# Patient Record
Sex: FEMALE | Race: WHITE
[De-identification: names, ages, dates, MRNs, and addresses within clinical notes are randomized per-mention and may not be internally consistent; named-entity substitution may affect disease eponyms.]

---

## 2019-08-27 ENCOUNTER — HOSPITAL ENCOUNTER (EMERGENCY)
Dept: HOSPITAL 91 - E/R | Age: 59
Discharge: HOME | End: 2019-08-27
Payer: COMMERCIAL

## 2019-08-27 ENCOUNTER — HOSPITAL ENCOUNTER (EMERGENCY)
Dept: HOSPITAL 10 - E/R | Age: 59
Discharge: HOME | End: 2019-08-27
Payer: COMMERCIAL

## 2019-08-27 VITALS — HEART RATE: 95 BPM | DIASTOLIC BLOOD PRESSURE: 80 MMHG | SYSTOLIC BLOOD PRESSURE: 105 MMHG | RESPIRATION RATE: 16 BRPM

## 2019-08-27 VITALS
WEIGHT: 160.34 LBS | HEIGHT: 67 IN | BODY MASS INDEX: 25.17 KG/M2 | HEIGHT: 67 IN | WEIGHT: 160.34 LBS | BODY MASS INDEX: 25.17 KG/M2

## 2019-08-27 DIAGNOSIS — E03.9: ICD-10-CM

## 2019-08-27 DIAGNOSIS — R40.2362: ICD-10-CM

## 2019-08-27 DIAGNOSIS — R42: Primary | ICD-10-CM

## 2019-08-27 DIAGNOSIS — R40.2142: ICD-10-CM

## 2019-08-27 DIAGNOSIS — R40.2252: ICD-10-CM

## 2019-08-27 LAB
ADD MAN DIFF?: NO
ALANINE AMINOTRANSFERASE: 26 IU/L (ref 13–69)
ALBUMIN/GLOBULIN RATIO: 1.41
ALBUMIN: 4.1 G/DL (ref 3.3–4.9)
ALKALINE PHOSPHATASE: 77 IU/L (ref 42–121)
ANION GAP: 11 (ref 5–13)
ASPARTATE AMINO TRANSFERASE: 28 IU/L (ref 15–46)
BASOPHIL #: 0 10^3/UL (ref 0–0.1)
BASOPHILS %: 0.4 % (ref 0–2)
BILIRUBIN,DIRECT: 0 MG/DL (ref 0–0.2)
BILIRUBIN,TOTAL: 0.7 MG/DL (ref 0.2–1.3)
BLOOD UREA NITROGEN: 13 MG/DL (ref 7–20)
CALCIUM: 10.3 MG/DL (ref 8.4–10.2)
CARBON DIOXIDE: 23 MMOL/L (ref 21–31)
CHLORIDE: 109 MMOL/L (ref 97–110)
CREATININE: 0.66 MG/DL (ref 0.44–1)
EOSINOPHILS #: 0 10^3/UL (ref 0–0.5)
EOSINOPHILS %: 0.9 % (ref 0–7)
GLOBULIN: 2.9 G/DL (ref 1.3–3.2)
GLUCOSE: 107 MG/DL (ref 70–220)
HEMATOCRIT: 41.9 % (ref 37–47)
HEMOGLOBIN: 13.8 G/DL (ref 12–16)
IMMATURE GRANS #M: 0.01 10^3/UL (ref 0–0.03)
IMMATURE GRANS % (M): 0.2 % (ref 0–0.43)
LYMPHOCYTES #: 1.5 10^3/UL (ref 0.8–2.9)
LYMPHOCYTES %: 32.5 % (ref 15–51)
MEAN CORPUSCULAR HEMOGLOBIN: 29.2 PG (ref 29–33)
MEAN CORPUSCULAR HGB CONC: 32.9 G/DL (ref 32–37)
MEAN CORPUSCULAR VOLUME: 88.6 FL (ref 82–101)
MEAN PLATELET VOLUME: 10.3 FL (ref 7.4–10.4)
MONOCYTE #: 0.4 10^3/UL (ref 0.3–0.9)
MONOCYTES %: 9.4 % (ref 0–11)
NEUTROPHIL #: 2.6 10^3/UL (ref 1.6–7.5)
NEUTROPHILS %: 56.6 % (ref 39–77)
NUCLEATED RED BLOOD CELLS #: 0 10^3/UL (ref 0–0)
NUCLEATED RED BLOOD CELLS%: 0 /100WBC (ref 0–0)
PLATELET COUNT: 314 10^3/UL (ref 140–415)
POTASSIUM: 4.2 MMOL/L (ref 3.5–5.1)
RED BLOOD COUNT: 4.73 10^6/UL (ref 4.2–5.4)
RED CELL DISTRIBUTION WIDTH: 12.9 % (ref 11.5–14.5)
SODIUM: 143 MMOL/L (ref 135–144)
TOTAL PROTEIN: 7 G/DL (ref 6.1–8.1)
TROPONIN-I: < 0.012 NG/ML (ref 0–0.12)
WHITE BLOOD COUNT: 4.6 10^3/UL (ref 4.8–10.8)

## 2019-08-27 PROCEDURE — 71275 CT ANGIOGRAPHY CHEST: CPT

## 2019-08-27 PROCEDURE — 84484 ASSAY OF TROPONIN QUANT: CPT

## 2019-08-27 PROCEDURE — 85025 COMPLETE CBC W/AUTO DIFF WBC: CPT

## 2019-08-27 PROCEDURE — 36415 COLL VENOUS BLD VENIPUNCTURE: CPT

## 2019-08-27 PROCEDURE — 93005 ELECTROCARDIOGRAM TRACING: CPT

## 2019-08-27 PROCEDURE — 96374 THER/PROPH/DIAG INJ IV PUSH: CPT

## 2019-08-27 PROCEDURE — 99285 EMERGENCY DEPT VISIT HI MDM: CPT

## 2019-08-27 PROCEDURE — 96361 HYDRATE IV INFUSION ADD-ON: CPT

## 2019-08-27 PROCEDURE — 80053 COMPREHEN METABOLIC PANEL: CPT

## 2019-08-27 RX ADMIN — LIDOCAINE HYDROCHLORIDE 1 MLS/HR: 10 INJECTION, SOLUTION EPIDURAL; INFILTRATION; INTRACAUDAL; PERINEURAL at 09:25

## 2019-08-27 RX ADMIN — IOHEXOL 1 ML/25 S: 350 INJECTION, SOLUTION INTRAVENOUS at 11:29

## 2019-08-27 RX ADMIN — LORAZEPAM 1 MG: 2 INJECTION, SOLUTION INTRAMUSCULAR; INTRAVENOUS at 09:25

## 2019-08-27 RX ADMIN — SODIUM CHLORIDE 1 ML: 9 INJECTION, SOLUTION INTRAMUSCULAR; INTRAVENOUS; SUBCUTANEOUS at 11:26

## 2019-08-27 RX ADMIN — VASOPRESSIN 1 ML/12 S: 20 INJECTION, SOLUTION INTRAMUSCULAR; SUBCUTANEOUS at 11:29

## 2019-08-27 RX ADMIN — THIAMINE HYDROCHLORIDE 1 MLS/HR: 100 INJECTION, SOLUTION INTRAMUSCULAR; INTRAVENOUS at 11:36

## 2019-11-07 ENCOUNTER — OFFICE (OUTPATIENT)
Dept: URBAN - METROPOLITAN AREA CLINIC 13 | Facility: CLINIC | Age: 59
End: 2019-11-07

## 2019-11-07 VITALS
DIASTOLIC BLOOD PRESSURE: 53 MMHG | WEIGHT: 161 LBS | HEART RATE: 69 BPM | HEIGHT: 67 IN | SYSTOLIC BLOOD PRESSURE: 83 MMHG

## 2019-11-07 DIAGNOSIS — N80.9: ICD-10-CM

## 2019-11-07 DIAGNOSIS — R12: ICD-10-CM

## 2019-11-07 DIAGNOSIS — R10.30: ICD-10-CM

## 2019-11-07 LAB
BUN: 18 MG/DL (ref 6–24)
CREATININE: 0.73 MG/DL (ref 0.57–1)
CREATININE: EGFR IF AFRICN AM: 105 ML/MIN/1.73 (ref 59–?)
CREATININE: EGFR IF NONAFRICN AM: 91 ML/MIN/1.73 (ref 59–?)

## 2019-11-07 PROCEDURE — 99243 OFF/OP CNSLTJ NEW/EST LOW 30: CPT | Performed by: INTERNAL MEDICINE

## 2019-11-07 NOTE — SERVICEHPINOTES
In 2005 - emergent hysterectomy secondary to ruptured mass in abdomen due to endometriosis.Willie had SB/Colon resection about 3 years ago - emergency surgery. Prior to that she had intermittent abd pain as well with "gas problems."BRPt had Colonoscopy Feb. 2018 at [facility] - that was normal.  Pt did not have EGD.  Pt was told she had SIBO and went on abx then low FODMAP diet.  Pt felt better.  In 2018, pt had CT that was okay.Willie had pelvic ultrasound recently - ok.  She is asked to wean off estrogen 2/2 endometriosis.Willie has now worsening abdominal cramping for 2 mo that radiates to back, legs and chest.  Her BM is ok.Willie is leaving the country Dec. 10th to travel for one year ([place], [place], [place], [place]FONT style="BACKGROUND-COLOR: #ffffff" visited="true"BR/FONT

## 2019-11-18 ENCOUNTER — AMBULATORY SURGICAL CENTER (OUTPATIENT)
Dept: URBAN - METROPOLITAN AREA SURGERY 6 | Facility: SURGERY | Age: 59
End: 2019-11-18

## 2019-11-18 VITALS
RESPIRATION RATE: 14 BRPM | DIASTOLIC BLOOD PRESSURE: 58 MMHG | WEIGHT: 160 LBS | HEIGHT: 67 IN | TEMPERATURE: 97.9 F | HEART RATE: 61 BPM | SYSTOLIC BLOOD PRESSURE: 102 MMHG | OXYGEN SATURATION: 98 %

## 2019-11-18 DIAGNOSIS — K29.80 DUODENITIS WITHOUT BLEEDING: ICD-10-CM

## 2019-11-18 DIAGNOSIS — K29.70 GASTRITIS, UNSPECIFIED, WITHOUT BLEEDING: ICD-10-CM

## 2019-11-18 DIAGNOSIS — R12 HEARTBURN: ICD-10-CM

## 2019-11-18 DIAGNOSIS — K31.89 OTHER DISEASES OF STOMACH AND DUODENUM: ICD-10-CM

## 2019-11-18 PROCEDURE — 43239 EGD BIOPSY SINGLE/MULTIPLE: CPT | Performed by: INTERNAL MEDICINE

## 2019-11-18 NOTE — SERVICEHPINOTES
BR- Abdominal pain, other or multiple sites - cramping/pressure, worsening, radiationBR- Heartburn - worseningBR

## 2019-11-20 LAB
PATHOLOGY REPORT: .: (no result)

## 2019-11-30 LAB
CT ABD  AND  PEL W/DYE: (no result)
CT ABD  AND  PEL W/DYE: NOTE: (no result)

## 2019-12-03 ENCOUNTER — OFFICE (OUTPATIENT)
Dept: URBAN - METROPOLITAN AREA CLINIC 13 | Facility: CLINIC | Age: 59
End: 2019-12-03

## 2019-12-03 VITALS
TEMPERATURE: 97.8 F | DIASTOLIC BLOOD PRESSURE: 63 MMHG | HEIGHT: 67 IN | HEART RATE: 72 BPM | SYSTOLIC BLOOD PRESSURE: 87 MMHG | WEIGHT: 164 LBS

## 2019-12-03 DIAGNOSIS — K29.70 GASTRITIS, UNSPECIFIED, WITHOUT BLEEDING: ICD-10-CM

## 2019-12-03 DIAGNOSIS — R10.30: ICD-10-CM

## 2019-12-03 DIAGNOSIS — K21.9 GERD: ICD-10-CM

## 2019-12-03 DIAGNOSIS — K29.80: ICD-10-CM

## 2019-12-03 DIAGNOSIS — N80.9: ICD-10-CM

## 2019-12-03 DIAGNOSIS — R12: ICD-10-CM

## 2019-12-03 PROCEDURE — 99213 OFFICE O/P EST LOW 20 MIN: CPT | Performed by: INTERNAL MEDICINE

## 2019-12-03 RX ORDER — FAMOTIDINE 40 MG/1
40 TABLET, FILM COATED ORAL
Qty: 90 | Status: ACTIVE
Start: 2019-12-03

## 2019-12-03 NOTE — SERVICEHPINOTES
Patient stil having same pain, gas and bloating bilaterally in abdomen.BRShe does not like to lower fiber in her diet as that's her "usual diet and healthy diet" so she likes her high fiber diet.  EGD showed mild gastritis and esophagitis, neg Hp.  She does drink she said one glass of wine nightly often.  CT showed mild fatty liver otherwise small benign liver cysts.